# Patient Record
Sex: FEMALE | Race: BLACK OR AFRICAN AMERICAN | Employment: STUDENT | URBAN - METROPOLITAN AREA
[De-identification: names, ages, dates, MRNs, and addresses within clinical notes are randomized per-mention and may not be internally consistent; named-entity substitution may affect disease eponyms.]

---
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
## 2025-02-25 ENCOUNTER — OFFICE VISIT (OUTPATIENT)
Age: 20
End: 2025-02-25

## 2025-02-25 VITALS
OXYGEN SATURATION: 98 % | WEIGHT: 133 LBS | DIASTOLIC BLOOD PRESSURE: 78 MMHG | HEART RATE: 69 BPM | SYSTOLIC BLOOD PRESSURE: 118 MMHG

## 2025-02-25 DIAGNOSIS — N89.8 VAGINAL ITCHING: Primary | ICD-10-CM

## 2025-02-25 LAB
BILIRUBIN, POC: NEGATIVE
BLOOD URINE, POC: NEGATIVE
CLARITY, POC: CLEAR
COLOR, POC: YELLOW
GLUCOSE URINE, POC: NEGATIVE MG/DL
KETONES, POC: NEGATIVE MG/DL
LEUKOCYTE EST, POC: ABNORMAL
NITRITE, POC: NEGATIVE
PH, POC: 7.5
PROTEIN, POC: NEGATIVE MG/DL
SPECIFIC GRAVITY, POC: 1.02
UROBILINOGEN, POC: 0.2 MG/DL

## 2025-02-25 ASSESSMENT — ENCOUNTER SYMPTOMS: ABDOMINAL PAIN: 0

## 2025-02-25 NOTE — PATIENT INSTRUCTIONS
You have been diagnosed with exposure to STI and vaginal itching. We recommend the following  Abstain from sexual activity until test results are available  Please call for test results in the next 2-3 days

## 2025-02-25 NOTE — PROGRESS NOTES
Joao Smith (:  2005) is a 19 y.o. female,Established patient, here for evaluation of the following chief complaint(s):  Vaginal Itching (Symptoms began 2 weeks ago )      ASSESSMENT/PLAN:    ICD-10-CM    1. Vaginal itching  N89.8 C.trachomatis N.gonorrhoeae DNA, Urine     VAGINITIS PANEL PCR     POCT Urinalysis no Micro     Culture, Urine              Follow up in 7 days if symptoms persist or if symptoms worsen.    SUBJECTIVE/OBJECTIVE:  Patient presents to [facility] for v/o vaginal itching. 6 weeks postpartum. Vaginal itching, white discharge. Doesn't think its candidiasis. Has not tried any OTC treatment or diflucan. Has had previously and was Bacterial vagnosis.       History provided by:  Patient   used: No    Vaginal Itching  The patient's primary symptoms include genital itching, a genital odor, a genital rash and vaginal discharge. The current episode started 1 to 4 weeks ago. Associated symptoms include frequency. Pertinent negatives include no abdominal pain, dysuria, flank pain, hematuria, rash or urgency.           Vitals:    25 1829   BP: 118/78   Site: Left Upper Arm   Position: Sitting   Cuff Size: Small Adult   Pulse: 69   SpO2: 98%   Weight: 60.3 kg (133 lb)       Review of Systems   Gastrointestinal:  Negative for abdominal pain.   Genitourinary:  Positive for frequency and vaginal discharge. Negative for dysuria, flank pain, hematuria and urgency.   Skin:  Negative for rash.       Physical Exam      An electronic signature was used to authenticate this note.    --Vanessa Garcia PA-C

## 2025-02-26 LAB
BACTERIA UR CULT: ABNORMAL
BV BACTERIA DNA VAG QL NAA+PROBE: DETECTED
C GLABRATA DNA VAG QL NAA+PROBE: ABNORMAL
C GLABRATA DNA VAG QL NAA+PROBE: NOT DETECTED
C KRUSEI DNA VAG QL NAA+PROBE: NOT DETECTED
C TRACH DNA UR QL NAA+PROBE: NEGATIVE
CANDIDA DNA VAG QL NAA+PROBE: DETECTED
N GONORRHOEA DNA UR QL NAA+PROBE: NEGATIVE
ORGANISM: ABNORMAL
T VAGINALIS DNA VAG QL NAA+PROBE: NOT DETECTED

## 2025-02-28 ENCOUNTER — TELEPHONE (OUTPATIENT)
Age: 20
End: 2025-02-28

## 2025-02-28 DIAGNOSIS — B96.89 BACTERIAL VAGINOSIS: Primary | ICD-10-CM

## 2025-02-28 DIAGNOSIS — B37.31 VAGINAL YEAST INFECTION: ICD-10-CM

## 2025-02-28 DIAGNOSIS — N30.00 ACUTE CYSTITIS WITHOUT HEMATURIA: ICD-10-CM

## 2025-02-28 DIAGNOSIS — N76.0 BACTERIAL VAGINOSIS: Primary | ICD-10-CM

## 2025-02-28 RX ORDER — SULFAMETHOXAZOLE AND TRIMETHOPRIM 800; 160 MG/1; MG/1
1 TABLET ORAL 2 TIMES DAILY
Qty: 6 TABLET | Refills: 0 | Status: SHIPPED | OUTPATIENT
Start: 2025-02-28 | End: 2025-03-03

## 2025-02-28 RX ORDER — METRONIDAZOLE 7.5 MG/G
1 GEL VAGINAL NIGHTLY
Qty: 5 EACH | Refills: 0 | Status: SHIPPED | OUTPATIENT
Start: 2025-02-28 | End: 2025-03-05

## 2025-02-28 RX ORDER — FLUCONAZOLE 150 MG/1
150 TABLET ORAL
Qty: 2 TABLET | Refills: 0 | Status: SHIPPED | OUTPATIENT
Start: 2025-02-28 | End: 2025-03-06

## 2025-02-28 NOTE — TELEPHONE ENCOUNTER
Message left for Joao to return call to clinic  She has Bacterial Vaginosis, Vaginal yeast and a UTI.   Medications sent to her pharmacy on file.

## 2025-02-28 NOTE — RESULT ENCOUNTER NOTE
Message left for Joao to call the clinic to discuss lab results. Medications sent to her pharmacy on record.

## 2025-06-18 ENCOUNTER — OFFICE VISIT (OUTPATIENT)
Age: 20
End: 2025-06-18

## 2025-06-18 VITALS
HEIGHT: 63 IN | OXYGEN SATURATION: 99 % | DIASTOLIC BLOOD PRESSURE: 71 MMHG | BODY MASS INDEX: 23.74 KG/M2 | HEART RATE: 88 BPM | RESPIRATION RATE: 16 BRPM | TEMPERATURE: 97.8 F | WEIGHT: 134 LBS | SYSTOLIC BLOOD PRESSURE: 108 MMHG

## 2025-06-18 DIAGNOSIS — L30.9 DERMATITIS: Primary | ICD-10-CM

## 2025-06-18 RX ORDER — METHYLPREDNISOLONE 4 MG/1
TABLET ORAL
Qty: 1 KIT | Refills: 0 | Status: SHIPPED | OUTPATIENT
Start: 2025-06-18 | End: 2025-06-24

## 2025-06-18 ASSESSMENT — ENCOUNTER SYMPTOMS
CONSTIPATION: 0
DIARRHEA: 0
SHORTNESS OF BREATH: 0
NAUSEA: 0
COUGH: 0
CHEST TIGHTNESS: 0
ABDOMINAL PAIN: 0
VOMITING: 0

## 2025-06-18 NOTE — PROGRESS NOTES
Joao Smith (:  2005) is a 19 y.o. female,Established patient, here for evaluation of the following chief complaint(s):  Rash (Pt states she woke up this morning with bumps on both ears)      ASSESSMENT/PLAN:    ICD-10-CM    1. Dermatitis  L30.9 methylPREDNISolone (MEDROL DOSEPACK) 4 MG tablet          Patient presents for rash to ears  On exam: Blister like lesions to bilateral ears noted.   DX: Contact dermatitis secondary to shampoo  Will rx medrol dosepack.   Patient given return and ED precautions.     SUBJECTIVE/OBJECTIVE:    History provided by:  Patient   used: No    Rash  Pertinent negatives include no cough, diarrhea, fatigue, fever, shortness of breath or vomiting.     HPI:   19 y.o. female presents with symptoms of rash to bilateral ears ongoing since morning. No new soaps, creams, detergents. She has used hydrocortisone ointment with some relief. No new medications. She did use a new shampoo today.     Vitals:    25 1810   BP: 108/71   Pulse: 88   Resp: 16   Temp: 97.8 °F (36.6 °C)   SpO2: 99%   Weight: 60.8 kg (134 lb)   Height: 1.6 m (5' 3\")       Review of Systems   Constitutional:  Negative for fatigue and fever.   Respiratory:  Negative for cough, chest tightness and shortness of breath.    Cardiovascular:  Negative for chest pain.   Gastrointestinal:  Negative for abdominal pain, constipation, diarrhea, nausea and vomiting.   Skin:  Positive for rash.       Physical Exam  Vitals and nursing note reviewed.   Constitutional:       Appearance: Normal appearance.   HENT:      Head: Normocephalic and atraumatic.   Eyes:      Extraocular Movements: Extraocular movements intact.      Conjunctiva/sclera: Conjunctivae normal.   Cardiovascular:      Rate and Rhythm: Normal rate and regular rhythm.      Pulses: Normal pulses.      Heart sounds: Normal heart sounds.   Pulmonary:      Effort: Pulmonary effort is normal.      Breath sounds: Normal breath sounds. No